# Patient Record
Sex: MALE | Race: WHITE | HISPANIC OR LATINO | Employment: FULL TIME | URBAN - METROPOLITAN AREA
[De-identification: names, ages, dates, MRNs, and addresses within clinical notes are randomized per-mention and may not be internally consistent; named-entity substitution may affect disease eponyms.]

---

## 2023-05-20 ENCOUNTER — HOSPITAL ENCOUNTER (EMERGENCY)
Facility: MEDICAL CENTER | Age: 58
End: 2023-05-20
Attending: EMERGENCY MEDICINE

## 2023-05-20 ENCOUNTER — APPOINTMENT (OUTPATIENT)
Dept: RADIOLOGY | Facility: MEDICAL CENTER | Age: 58
End: 2023-05-20
Attending: EMERGENCY MEDICINE

## 2023-05-20 VITALS
RESPIRATION RATE: 18 BRPM | BODY MASS INDEX: 24.76 KG/M2 | TEMPERATURE: 98.2 F | SYSTOLIC BLOOD PRESSURE: 121 MMHG | WEIGHT: 163.36 LBS | DIASTOLIC BLOOD PRESSURE: 82 MMHG | OXYGEN SATURATION: 97 % | HEART RATE: 72 BPM | HEIGHT: 68 IN

## 2023-05-20 DIAGNOSIS — M79.602 LEFT ARM PAIN: ICD-10-CM

## 2023-05-20 DIAGNOSIS — M54.13 RADICULOPATHY OF CERVICOTHORACIC REGION: ICD-10-CM

## 2023-05-20 LAB — EKG IMPRESSION: NORMAL

## 2023-05-20 PROCEDURE — A9270 NON-COVERED ITEM OR SERVICE: HCPCS | Performed by: EMERGENCY MEDICINE

## 2023-05-20 PROCEDURE — 700102 HCHG RX REV CODE 250 W/ 637 OVERRIDE(OP): Performed by: EMERGENCY MEDICINE

## 2023-05-20 PROCEDURE — 99284 EMERGENCY DEPT VISIT MOD MDM: CPT

## 2023-05-20 PROCEDURE — 96375 TX/PRO/DX INJ NEW DRUG ADDON: CPT

## 2023-05-20 PROCEDURE — 93005 ELECTROCARDIOGRAM TRACING: CPT

## 2023-05-20 PROCEDURE — 72125 CT NECK SPINE W/O DYE: CPT

## 2023-05-20 PROCEDURE — 96374 THER/PROPH/DIAG INJ IV PUSH: CPT

## 2023-05-20 PROCEDURE — 700111 HCHG RX REV CODE 636 W/ 250 OVERRIDE (IP): Performed by: EMERGENCY MEDICINE

## 2023-05-20 PROCEDURE — 93005 ELECTROCARDIOGRAM TRACING: CPT | Performed by: EMERGENCY MEDICINE

## 2023-05-20 RX ORDER — DEXAMETHASONE 4 MG/1
10 TABLET ORAL DAILY
Qty: 12 TABLET | Refills: 0 | Status: SHIPPED | OUTPATIENT
Start: 2023-05-20

## 2023-05-20 RX ORDER — DEXAMETHASONE SODIUM PHOSPHATE 10 MG/ML
10 INJECTION, SOLUTION INTRAMUSCULAR; INTRAVENOUS ONCE
Status: COMPLETED | OUTPATIENT
Start: 2023-05-20 | End: 2023-05-20

## 2023-05-20 RX ORDER — KETOROLAC TROMETHAMINE 10 MG/1
10 TABLET, FILM COATED ORAL 3 TIMES DAILY PRN
Qty: 15 TABLET | Refills: 0 | Status: SHIPPED | OUTPATIENT
Start: 2023-05-20

## 2023-05-20 RX ORDER — ONDANSETRON 2 MG/ML
4 INJECTION INTRAMUSCULAR; INTRAVENOUS ONCE
Status: COMPLETED | OUTPATIENT
Start: 2023-05-20 | End: 2023-05-20

## 2023-05-20 RX ORDER — TRAMADOL HYDROCHLORIDE 50 MG/1
50 TABLET ORAL EVERY 6 HOURS PRN
Qty: 28 TABLET | Refills: 0 | Status: SHIPPED | OUTPATIENT
Start: 2023-05-20 | End: 2023-05-27

## 2023-05-20 RX ORDER — METHOCARBAMOL 750 MG/1
750 TABLET, FILM COATED ORAL 4 TIMES DAILY
Qty: 120 TABLET | Refills: 0 | Status: SHIPPED | OUTPATIENT
Start: 2023-05-20 | End: 2023-07-05

## 2023-05-20 RX ORDER — METHOCARBAMOL 500 MG/1
1000 TABLET, FILM COATED ORAL ONCE
Status: COMPLETED | OUTPATIENT
Start: 2023-05-20 | End: 2023-05-20

## 2023-05-20 RX ORDER — MORPHINE SULFATE 4 MG/ML
4 INJECTION INTRAVENOUS ONCE
Status: COMPLETED | OUTPATIENT
Start: 2023-05-20 | End: 2023-05-20

## 2023-05-20 RX ORDER — KETOROLAC TROMETHAMINE 30 MG/ML
30 INJECTION, SOLUTION INTRAMUSCULAR; INTRAVENOUS ONCE
Status: COMPLETED | OUTPATIENT
Start: 2023-05-20 | End: 2023-05-20

## 2023-05-20 RX ADMIN — DEXAMETHASONE SODIUM PHOSPHATE 10 MG: 10 INJECTION INTRAMUSCULAR; INTRAVENOUS at 03:34

## 2023-05-20 RX ADMIN — KETOROLAC TROMETHAMINE 30 MG: 30 INJECTION, SOLUTION INTRAMUSCULAR; INTRAVENOUS at 03:32

## 2023-05-20 RX ADMIN — ONDANSETRON 4 MG: 2 INJECTION INTRAMUSCULAR; INTRAVENOUS at 03:34

## 2023-05-20 RX ADMIN — METHOCARBAMOL 1000 MG: 500 TABLET ORAL at 03:31

## 2023-05-20 RX ADMIN — MORPHINE SULFATE 4 MG: 4 INJECTION INTRAVENOUS at 03:36

## 2023-05-20 ASSESSMENT — PAIN DESCRIPTION - PAIN TYPE
TYPE: ACUTE PAIN

## 2023-05-20 NOTE — ED PROVIDER NOTES
ER Provider Note    Scribed for Dr. Audra Sher D.O. by Mikey Herzog. 5/20/2023  2:40 AM    Primary Care Provider: None noted    CHIEF COMPLAINT  Chief Complaint   Patient presents with    Pain     L side of body pain, pt describes it as nerve pain, constant, pain worsened tonight   Pt states he has had this pain since las year, saw neurologist which did not show any causes  Symptoms have not gone away  Has been taking amlodipine, ativan for BP and symptoms with little relief   Denies chest pain or SOB     EXTERNAL RECORDS REVIEWED  None available    HPI/ROS    LIMITATION TO HISTORY   Select: : None    Nikolas Mcarthur is a 57 y.o. male who presents to the ED for worsening left sided pain onset tonight. Months ago he was seen for hypertension by his cardiologist and had a catheterization which was negative. He was started on amlodipine at that time. At that time he was feeling back pain which he thought was a nerve, so he saw a neurologist. He had a MRI of his head which was negative but was unable to have one of his back as he quit his job. He then moved up here and got a new job, his insurance starts working in 10 days. He has been taking medications for his pain but tonight his pain is unbearable. His pain is describes as aching and radiates into his left arm. He denies any numbness or neck pain. He does not have a current PCP.    PAST MEDICAL HISTORY  Past Medical History:   Diagnosis Date    Hypertension      SURGICAL HISTORY  History reviewed. No pertinent surgical history.    FAMILY HISTORY  History reviewed. No pertinent family history.    SOCIAL HISTORY   reports that he has never smoked. He has never used smokeless tobacco. He reports that he does not currently use alcohol. He reports that he does not use drugs.    CURRENT MEDICATIONS  Discharge Medication List as of 5/20/2023  5:20 AM        ALLERGIES  Patient has no known allergies.    PHYSICAL EXAM  BP (!) 159/108   Pulse 94   Temp 36.8 °C (98.2 °F)  "(Temporal)   Resp 12   Ht 1.727 m (5' 8\")   Wt 74.1 kg (163 lb 5.8 oz)   SpO2 97%   BMI 24.84 kg/m²   Constitutional: Patient is well developed, well nourished.  Moderate distress from his left arm pain  HENT: Normocephalic, atraumatic.  External mucosa.  Cardiovascular: Normal heart rate and Regular rhythm. No murmur,   Thorax & Lungs: Clear and equal breath sounds with good excursion. No respiratory distress, no rhonchi, wheezing or rales.   Abdomen: Bowel sounds normal in all four quadrants. Soft,nontender, no rebound , guarding, palpable masses.   Skin: Warm, Dry, No erythema, No rashes.   Back: No cervical, or lumbosacral tenderness. Tenderness at the left thoracic spine at T2-T3 level extending in suprascapular region.  Extremities: Peripheral pulses 4/4     Neurologic: Alert & oriented x 3, Normal motor function, Normal sensory function, equal  strength bilaterally, subjective pain in left arm  Psychiatric: Affect normal, Judgment normal, Mood normal.     DIAGNOSTIC STUDIES & PROCEDURES    Radiology:   The attending Emergency Physician has independently interpreted the diagnostic imaging associated with this visit and is awaiting the final reading from the radiologist, which will be displayed below.  Preliminary interpretation is a follows: No herniated disks or spinal cord compression  Radiologist interpretation:    CT-CSPINE WITHOUT PLUS RECONS   Final Result         1. No acute fracture from C1 through T3 is visualized.             COURSE & MEDICAL DECISION MAKING    ED Observation Status? Yes; I am placing the patient in to an observation status due to a diagnostic uncertainty as well as therapeutic intensity. Patient placed in observation status at 2:43 AM, 5/20/2023.     Observation plan is as follows: Monitor for symptom management and diagnostic results     Upon Reevaluation, the patient's condition has: Improved; and will be discharged.    Patient discharged from ED Observation status at " 0509 (Time) 5/20/23 (Date).     INITIAL ASSESSMENT AND PLAN  Care Narrative:       2:40 AM - Patient seen and evaluated at bedside. Discussed plan of care, including medications, EKG and imaging. Patient agrees to plan of care. Patient will be treated with Decadron 10 mg, Toradol 30 mg. Robaxin 1000 mg, morphine 4 mg and Zofran 4 mg for his symptoms. Ordered CT-C-spine w/o to evaluate. Differential diagnoses include but are not limited to: cervical radiculopathy vs CAD vs musculoskeletal arm pain  Patient was notified that his CT was unremarkable.  He is completely pain-free upon recheck and thankful for the medications that he got.  I will send in prescriptions for Toradol, Robaxin and tramadol along with Decadron for home.  He sees a primary care doctor for further evaluation as he may need to have an MRI of his neck.  He is stable upon discharge.               DISPOSITION AND DISCUSSIONS  I have discussed management of the patient with the following physicians and LES's:     Discussion of management with other Q or appropriate source(s): None     Barriers to care at this time, including but not limited to: Patient does not have established PCP.     Decision tools and prescription drugs considered including, but not limited to: Pain Medications Robaxin, Toradol, tramadol, Decadron .        FINAL IMPRESSION   1. Radiculopathy of cervicothoracic region    2. Left arm pain         Mikey MORAN (Scribe), am scribing for, and in the presence of, Audra Sher D.O..    Electronically signed by: Mikey Herzog (Victorinoibe), 5/20/2023    Audra MORAN D.O. personally performed the services described in this documentation, as scribed by Mikey Herzog in my presence, and it is both accurate and complete.    The note accurately reflects work and decisions made by me.  Audra Sher D.O.  5/20/2023  7:17 AM

## 2023-05-20 NOTE — ED NOTES
Pt ambulated to red 6 from lobby with steady gait.  On monitor, call light in reach. Chart up for ERP.

## 2023-05-20 NOTE — DISCHARGE INSTRUCTIONS
Call first thing Monday morning to schedule an appointment with a primary care provider so that you can establish a doctor-patient relationship for further care and referrals.  Apply moist heat or warm moist compresses to the affected area of your neck and shoulder region.  Try using over-the-counter topical Biofreeze and then apply moist heat.  Take the medications I am prescribing you as directed with food  Return if worsening

## 2023-05-20 NOTE — ED TRIAGE NOTES
"Chief Complaint   Patient presents with    Pain     L side of body pain, pt describes it as nerve pain, constant, pain worsened tonight   Pt states he has had this pain since las year, saw neurologist which did not show any causes  Symptoms have not gone away  Has been taking amlodipine, ativan for BP and symptoms with little relief      BP (!) 159/108   Pulse 94   Temp 36.8 °C (98.2 °F) (Temporal)   Resp 12   Ht 1.727 m (5' 8\")   Wt 74.1 kg (163 lb 5.8 oz)   SpO2 97%   BMI 24.84 kg/m²     Pt made aware of triage process, placed back into lobby, educated pt to tell staff of any worsening of symptoms     "

## 2023-05-20 NOTE — ED NOTES
Checked on bed, connected to monitor,  with unlabored respirations. Vital signs is stable. Denied any new complaints. Gurney in low position, side rail up for pt safety. Call light within reach. Will continue to monitor. Chart up for ERP re-evaluation

## 2023-05-20 NOTE — ED NOTES
Pt discharged to ED lobby. GCS 15. IV discontinued and gauze placed, pt in possession of belongings. Pt provided discharge education and information pertaining to medications and follow up appointments. Pt received copy of discharge instructions and verbalized understanding.     Vitals:    05/20/23 0502   BP: 121/82   Pulse: 72   Resp: 18   Temp: 36.8 °C (98.2 °F)   SpO2: 97%

## 2023-05-24 ENCOUNTER — HOSPITAL ENCOUNTER (EMERGENCY)
Facility: MEDICAL CENTER | Age: 58
End: 2023-05-24
Attending: EMERGENCY MEDICINE

## 2023-05-24 VITALS
BODY MASS INDEX: 25.31 KG/M2 | SYSTOLIC BLOOD PRESSURE: 163 MMHG | RESPIRATION RATE: 18 BRPM | OXYGEN SATURATION: 95 % | WEIGHT: 166.98 LBS | HEIGHT: 68 IN | DIASTOLIC BLOOD PRESSURE: 103 MMHG | HEART RATE: 71 BPM | TEMPERATURE: 98 F

## 2023-05-24 DIAGNOSIS — Z76.0 MEDICATION REFILL: ICD-10-CM

## 2023-05-24 PROCEDURE — A9270 NON-COVERED ITEM OR SERVICE: HCPCS | Performed by: EMERGENCY MEDICINE

## 2023-05-24 PROCEDURE — 99283 EMERGENCY DEPT VISIT LOW MDM: CPT

## 2023-05-24 PROCEDURE — 700102 HCHG RX REV CODE 250 W/ 637 OVERRIDE(OP): Performed by: EMERGENCY MEDICINE

## 2023-05-24 RX ORDER — LORAZEPAM 1 MG/1
1 TABLET ORAL ONCE
Status: COMPLETED | OUTPATIENT
Start: 2023-05-24 | End: 2023-05-24

## 2023-05-24 RX ORDER — LORAZEPAM 1 MG/1
1 TABLET ORAL 2 TIMES DAILY
Qty: 6 TABLET | Refills: 0 | Status: SHIPPED | OUTPATIENT
Start: 2023-05-24 | End: 2023-05-27

## 2023-05-24 RX ORDER — LORAZEPAM 1 MG/1
1 TABLET ORAL 2 TIMES DAILY
Qty: 6 TABLET | Refills: 0 | Status: SHIPPED | OUTPATIENT
Start: 2023-05-24 | End: 2023-05-24 | Stop reason: SDUPTHER

## 2023-05-24 RX ADMIN — LORAZEPAM 1 MG: 1 TABLET ORAL at 03:47

## 2023-05-24 NOTE — ED TRIAGE NOTES
Chief Complaint   Patient presents with    Pain     Pt reports pain to L side of body starting today keeping him from sleeping (he has had an MRI and is followed by neurology and was prescribed ativan which he has been taking for 3 months)    Medication Refill     Ran out of lorazepam yesterday and can't get a refill because he is between insurances. Sx started today. Reports anxiety and tearfulness     Pt ambulatory to triage for above complaint. VSS, anxious and emotional but in NAD

## 2023-05-24 NOTE — ED PROVIDER NOTES
ED Provider Note    CHIEF COMPLAINT  Chief Complaint   Patient presents with    Pain     Pt reports pain to L side of body starting today keeping him from sleeping (he has had an MRI and is followed by neurology and was prescribed ativan which he has been taking for 3 months)    Medication Refill     Ran out of lorazepam yesterday and can't get a refill because he is between insurances. Sx started today. Reports anxiety and tearfulness       EXTERNAL RECORDS REVIEWED  Inpatient Notes ER note from 520 reviewed    HPI/ROS  LIMITATION TO HISTORY   Select: : None  OUTSIDE HISTORIAN(S):  None    Nikolas Mcarthur is a 57 y.o. male who [facility] the [facility] with [facility] refill.  Patient states that he was previously on Ativan but this was not refilled a few days ago during his ER visit.  He has been picked up the medications as provided the other day and he states that this is helped with the majority of his symptoms but is hopeful in getting a few days of his benzodiazepine until he can see his PCP for outpatient refill this coming Friday in 3 days time.  Otherwise denies any other new features or concerns.  He just continues to be anxious and hopeful for medication refill stated above.    PAST MEDICAL HISTORY   has a past medical history of Hypertension.    SURGICAL HISTORY  patient denies any surgical history    FAMILY HISTORY  History reviewed. No pertinent family history.    SOCIAL HISTORY  Social History     Tobacco Use    Smoking status: Never    Smokeless tobacco: Never   Substance and Sexual Activity    Alcohol use: Not Currently    Drug use: Never    Sexual activity: Not on file       CURRENT MEDICATIONS  Home Medications       Reviewed by Radha Razo R.N. (Registered Nurse) on 05/24/23 at 0152  Med List Status: Not Addressed     Medication Last Dose Status   dexamethasone (DECADRON) 4 MG Tab  Active   ketorolac (TORADOL) 10 MG Tab  Active   methocarbamol (ROBAXIN) 750 MG Tab  Active  "  traMADol (ULTRAM) 50 MG Tab  Active                    ALLERGIES  No Known Allergies    PHYSICAL EXAM  VITAL SIGNS: BP (!) 163/103   Pulse 71   Temp 36.7 °C (98 °F) (Temporal)   Resp 18   Ht 1.727 m (5' 8\")   Wt 75.7 kg (166 lb 15.6 oz)   SpO2 95%   BMI 25.39 kg/m²      Pulse ox interpretation: I interpret this pulse ox as normal.  Constitutional: Alert in no apparent distress.  HENT: No signs of trauma, Bilateral external ears normal, Nose normal.   Eyes: Pupils are equal and reactive  Neck: Normal range of motion, No tenderness, Supple  Cardiovascular: Regular rate and rhythm, no murmurs.   Thorax & Lungs: Normal breath sounds, No respiratory distress, No wheezing, No chest tenderness.   Abdomen: Bowel sounds normal, Soft, No tenderness, No masses, No pulsatile masses. No peritoneal signs.  Skin: Warm, Dry, No erythema, No rash.   Extremities: Intact distal pulses  Musculoskeletal: Good range of motion in all major joints. No tenderness to palpation or major deformities noted.   Neurologic: Alert , Normal motor function, Normal sensory function, No focal deficits noted.   Psychiatric: Affect is anxious, Judgment normal, Mood normal.     In prescribing controlled substances to this patient, I certify that I have obtained and reviewed the medical history of Nikolas Mcarthur. I have also made a good dianna effort to obtain applicable records from other providers who have treated the patient and records did not demonstrate any increased risk of substance abuse that would prevent me from prescribing controlled substances.     I have conducted a physical exam and documented it. I have reviewed Mr. Mcarthur’s prescription history as maintained by the [place] Prescription Monitoring Program.     I have assessed the patient’s risk for abuse, dependency, and addiction using the validated Opioid Risk Tool available at https://www.mdcalc.com/ibhzlj-syil-uncd-ort-narcotic-abuse.     Given the above, I believe the benefits of " controlled substance therapy outweigh the risks. The reasons for prescribing controlled substances include in my professional opinion, controlled substances are the only reasonable choice for this patient because for anxiety control . Accordingly, I have discussed the risk and benefits, treatment plan, and alternative therapies with the patient.       COURSE & MEDICAL DECISION MAKING    ED Observation Status? No; Patient does not meet criteria for ED Observation.     INITIAL ASSESSMENT, COURSE AND PLAN  Care Narrative: Patient presented emergency department for medication refill.  I will refill this with strict instructions the patient will need ongoing outpatient controlled substance refills to be completed by PCP.    DISPOSITION AND DISCUSSIONS  I have discussed management of the patient with the following physicians and LES's: None    Discussion of management with other QHP or appropriate source(s): Pharmacy for medication verification      Escalation of care considered, and ultimately not performed:blood analysis, diagnostic imaging, and acute inpatient care management, however at this time, the patient is most appropriate for outpatient management    Barriers to care at this time, including but not limited to:  Recently relocated from [place] and attempting to establish PCP here in [place] .     Decision tools and prescription drugs considered including, but not limited to: Medication modification medication refill will be completed .  57-year-old male presented to the [facility] with above presentation.  Ativan refilled for 3 days.  Patient understanding of strict use of this and that only 3-day supply will be completed and will not likely be recompleted if representation to the ER occurs.  He is understanding such controlled substances should be provided on an ongoing basis by PCP.  He will continue all other medications as previously prescribed.      FINAL DIAGNOSIS  1. Medication refill            Electronically signed by: Preston Kauffman M.D., 5/24/2023 3:42 AM

## 2023-05-24 NOTE — ED NOTES
Patient discharged home per ERP.  Discharge teaching and education discussed with patient. POC discussed.   Patient verbalized understanding of discharge teaching and education. No other questions at this time.     RX x 1 given to patient.     VSS. Patient alert and oriented. Patient arranged ride for self. Able to ambulate off unit safely with steady gait.

## 2023-06-09 ENCOUNTER — HOSPITAL ENCOUNTER (EMERGENCY)
Facility: MEDICAL CENTER | Age: 58
End: 2023-06-09
Attending: EMERGENCY MEDICINE
Payer: COMMERCIAL

## 2023-06-09 VITALS
HEART RATE: 82 BPM | WEIGHT: 166 LBS | OXYGEN SATURATION: 98 % | RESPIRATION RATE: 18 BRPM | BODY MASS INDEX: 25.24 KG/M2 | TEMPERATURE: 98.9 F | DIASTOLIC BLOOD PRESSURE: 98 MMHG | SYSTOLIC BLOOD PRESSURE: 140 MMHG

## 2023-06-09 DIAGNOSIS — S39.012A STRAIN OF LUMBAR REGION, INITIAL ENCOUNTER: ICD-10-CM

## 2023-06-09 PROCEDURE — 99284 EMERGENCY DEPT VISIT MOD MDM: CPT

## 2023-06-09 PROCEDURE — 700102 HCHG RX REV CODE 250 W/ 637 OVERRIDE(OP): Performed by: EMERGENCY MEDICINE

## 2023-06-09 PROCEDURE — A9270 NON-COVERED ITEM OR SERVICE: HCPCS | Performed by: EMERGENCY MEDICINE

## 2023-06-09 RX ORDER — METHYLPREDNISOLONE 4 MG/1
TABLET ORAL
Qty: 1 EACH | Refills: 0 | Status: SHIPPED | OUTPATIENT
Start: 2023-06-09

## 2023-06-09 RX ORDER — DEXAMETHASONE 4 MG/1
8 TABLET ORAL ONCE
Status: COMPLETED | OUTPATIENT
Start: 2023-06-09 | End: 2023-06-09

## 2023-06-09 RX ORDER — CYCLOBENZAPRINE HCL 10 MG
10 TABLET ORAL 3 TIMES DAILY PRN
Qty: 30 TABLET | Refills: 0 | Status: SHIPPED | OUTPATIENT
Start: 2023-06-09 | End: 2023-07-05

## 2023-06-09 RX ORDER — OXYCODONE HYDROCHLORIDE AND ACETAMINOPHEN 5; 325 MG/1; MG/1
2 TABLET ORAL ONCE
Status: COMPLETED | OUTPATIENT
Start: 2023-06-09 | End: 2023-06-09

## 2023-06-09 RX ADMIN — OXYCODONE AND ACETAMINOPHEN 2 TABLET: 325; 5 TABLET ORAL at 11:28

## 2023-06-09 RX ADMIN — DEXAMETHASONE 8 MG: 4 TABLET ORAL at 11:04

## 2023-06-09 ASSESSMENT — PAIN DESCRIPTION - PAIN TYPE: TYPE: ACUTE PAIN;NEUROPATHIC PAIN

## 2023-06-09 NOTE — ED PROVIDER NOTES
ED Provider Note    CHIEF COMPLAINT  Chief Complaint   Patient presents with    Flank Pain     BIB EMS, bilateral flank pain after standing from desk    Low Back Pain       EXTERNAL RECORDS REVIEWED  None available    HPI/ROS  LIMITATION TO HISTORY   None  OUTSIDE HISTORIAN(S):  None    Nikolas Mcarthur is a 58 y.o. male who presents evaluation of an episode of acute low back pain.  The patient was at work, was hunching over a desk and felt an immediate sense of pain in his low back.  He denies any direct blunt or penetrating trauma.  The pain was so bad it brought him to his knees and 911 was called.  He specifically denies any bowel or bladder incontinence numbness weakness or tingling to the arms legs or face.  He is not on any anticoagulants or blood thinners.  He only takes amlodipine for hypertension.  No history of neck or back surgery.  No flank pain or urinary symptoms such as dysuria or hematuria.  No high fevers or chills.  Patient has never experienced pain like this before does appear to be improving when he lies flat    PAST MEDICAL HISTORY   has a past medical history of Hypertension.    SURGICAL HISTORY  patient denies any surgical history  No major surgeries  FAMILY HISTORY  No family history on file.  Noncontributory  SOCIAL HISTORY  Social History     Tobacco Use    Smoking status: Never    Smokeless tobacco: Never   Substance and Sexual Activity    Alcohol use: Not Currently    Drug use: Never    Sexual activity: Not on file       CURRENT MEDICATIONS  Home Medications       Reviewed by Jodie Louis R.N. (Registered Nurse) on 06/09/23 at 1023  Med List Status: Partial     Medication Last Dose Status   dexamethasone (DECADRON) 4 MG Tab  Active   ketorolac (TORADOL) 10 MG Tab  Active   methocarbamol (ROBAXIN) 750 MG Tab  Active                    ALLERGIES  No Known Allergies    PHYSICAL EXAM  VITAL SIGNS: BP (!) 137/99   Pulse (!) 102   Temp 37.2 °C (98.9 °F) (Temporal)   Resp 16   Wt 75.3  kg (166 lb)   SpO2 97%   BMI 25.24 kg/m²    Pulse ox interpretation: I interpret this pulse ox as normal.  Constitutional: Alert and oriented x 3, no acute distress  HEENT: Atraumatic normocephalic, pupils are equal round reactive to light extraocular movements are intact. The nares is clear, external ears are normal, mouth shows moist mucous membranes normal dentition for age  Neck: Supple, no JVD no tracheal deviation  Cardiovascular: Mild tachycardia no murmur rub or gallop 2+ pulses peripherally x4  Thorax & Lungs: No respiratory distress, no wheezes rales or rhonchi, No chest tenderness.   GI: Soft nontender nondistended positive bowel sounds, no peritoneal signs no flank pain no rebound or guarding  Back: No midline thoracolumbar bony tenderness there is significant paraspinal spasm noted at L3-L4-L5 without step-off no overlying skin changes.  Skin: Warm dry no acute rash or lesion  Musculoskeletal: Moving all extremities with full range and 5 of 5 strength no acute  deformity  Neurologic: Cranial nerves III through XII are grossly intact no sensory deficit no cerebellar dysfunction normal motor and sensory exam in upper and lower extremities bilateral patellar DTRs intact  Psychiatric: Anxious        DIAGNOSTIC STUDIES / PROCEDURES  Considered imaging but not clinically indicated  LABS  Considered but not clinically indicated consider considered imaging but not clinically indicated        COURSE & MEDICAL DECISION MAKING    ED Observation Status? No; Patient does not meet criteria for ED Observation.     INITIAL ASSESSMENT, COURSE AND PLAN  Care Narrative:    This is a very pleasant 58-year-old gentleman presents here with nontraumatic acute low back pain.  His vital signs are reassuring and normal.  Specifically no hypotension high fever tachycardia or hypoxia.  On exam he has some paraspinal spasm and pain but no midline bony tenderness.  There is no direct blunt or penetrating trauma to suggest high  likelihood of fracture.  He had no neurological symptoms such as bowel or bladder incontinence motor or sensory loss to suggest cauda equina or spinal cord compression or injury.  I counseled the patient that routine imaging for this type of symptomatology is not routinely performed in the emergency department.  He was given a dose of Decadron and Percocet here.  I will continue him on a course of a Medrol Dosepak and Flexeril and recommend he establish with a PCP.  I will provide referral for outpatient PCP      ADDITIONAL PROBLEM LIST    DISPOSITION AND DISCUSSIONS  I have discussed management of the patient with the following physicians and LES's: None    Discussion of management with other QHP or appropriate source(s): None    Escalation of care considered, and ultimately not performed:IV fluids, blood analysis, and diagnostic imaging    Barriers to care at this time, including but not limited to: Patient does not have established PCP.     Decision tools and prescription drugs considered including, but not limited to: Patient will be prescribed steroids as well as muscle relaxants    FINAL DIAGNOSIS  1. Strain of lumbar region, initial encounter  methylPREDNISolone (MEDROL DOSEPAK) 4 MG Tablet Therapy Pack    cyclobenzaprine (FLEXERIL) 10 mg Tab    Referral to establish with [facility] PCP               Electronically signed by: Preston Villatoro M.D., 6/9/2023 10:46 AM

## 2023-06-09 NOTE — ED TRIAGE NOTES
Chief Complaint   Patient presents with    Flank Pain     BIB EMS, bilateral flank pain after standing from desk    Low Back Pain     Pt reports that he did not stretch this morning or warm up prior to going to work.  Reports that he was sitting at his desk when stood up and had severe pain that dropped him to the floor.   BP (!) 137/99   Pulse (!) 102   Temp 37.2 °C (98.9 °F) (Temporal)   Resp 16   Wt 75.3 kg (166 lb)   SpO2 97%   Pt informed of wait times. Educated on triage process.  Asked to return to triage RN for any new or worsening of symptoms. Thanked for patience.

## 2023-06-13 ENCOUNTER — TELEPHONE (OUTPATIENT)
Dept: HEALTH INFORMATION MANAGEMENT | Facility: OTHER | Age: 58
End: 2023-06-13
Payer: COMMERCIAL

## 2023-07-05 ENCOUNTER — HOSPITAL ENCOUNTER (EMERGENCY)
Facility: MEDICAL CENTER | Age: 58
End: 2023-07-05
Attending: EMERGENCY MEDICINE
Payer: COMMERCIAL

## 2023-07-05 VITALS
HEART RATE: 63 BPM | WEIGHT: 160 LBS | HEIGHT: 68 IN | DIASTOLIC BLOOD PRESSURE: 78 MMHG | BODY MASS INDEX: 24.25 KG/M2 | SYSTOLIC BLOOD PRESSURE: 127 MMHG | TEMPERATURE: 97.5 F | RESPIRATION RATE: 16 BRPM | OXYGEN SATURATION: 94 %

## 2023-07-05 DIAGNOSIS — R51.9 FACIAL PAIN: ICD-10-CM

## 2023-07-05 DIAGNOSIS — G89.29 CHRONIC BILATERAL LOW BACK PAIN, UNSPECIFIED WHETHER SCIATICA PRESENT: ICD-10-CM

## 2023-07-05 DIAGNOSIS — R25.1 TREMOR: ICD-10-CM

## 2023-07-05 DIAGNOSIS — M54.50 CHRONIC BILATERAL LOW BACK PAIN, UNSPECIFIED WHETHER SCIATICA PRESENT: ICD-10-CM

## 2023-07-05 LAB — EKG IMPRESSION: NORMAL

## 2023-07-05 PROCEDURE — A9270 NON-COVERED ITEM OR SERVICE: HCPCS | Performed by: EMERGENCY MEDICINE

## 2023-07-05 PROCEDURE — 700102 HCHG RX REV CODE 250 W/ 637 OVERRIDE(OP): Performed by: EMERGENCY MEDICINE

## 2023-07-05 PROCEDURE — 99283 EMERGENCY DEPT VISIT LOW MDM: CPT

## 2023-07-05 PROCEDURE — 93005 ELECTROCARDIOGRAM TRACING: CPT | Performed by: EMERGENCY MEDICINE

## 2023-07-05 RX ORDER — GABAPENTIN 300 MG/1
300 CAPSULE ORAL 3 TIMES DAILY PRN
Qty: 30 CAPSULE | Refills: 0 | Status: SHIPPED | OUTPATIENT
Start: 2023-07-05 | End: 2023-07-20

## 2023-07-05 RX ORDER — GABAPENTIN 300 MG/1
300 CAPSULE ORAL ONCE
Status: COMPLETED | OUTPATIENT
Start: 2023-07-05 | End: 2023-07-05

## 2023-07-05 RX ADMIN — GABAPENTIN 300 MG: 300 CAPSULE ORAL at 02:04

## 2023-07-05 NOTE — ED PROVIDER NOTES
ER Provider Note    Scribed for Fox Redmond Ii, M.d. by Rogelio Negrete. 7/5/2023  1:34 AM    Primary Care Provider: Pcp Pt States None    CHIEF COMPLAINT  Chief Complaint   Patient presents with    Tremors     Arms, legs and arms     EXTERNAL RECORDS REVIEWED  PDMP Patient gets #60 Ativan 1 mg tablet prescriptions every 2 months. Last prescription was filled 5/26/23.    HPI/ROS  LIMITATION TO HISTORY   Select: : None  OUTSIDE HISTORIAN(S):  None    Nikolas Mcarthur is a 58 y.o. male who presents to the ED complaining of having tremors, pain in his head around his left ear, and burning in his lower left back which onset 6 months ago. He states he was followed by a physician in [place] where an MRI was taken, which was inconclusive. He states he changed insurers due to a change in employment, and no longer has coverage for additional MRI's. He states he was shaking prior to arrival, but this has resolved. He initially states he does not use marijuana, but admits to using two marijuana edible products to help sleep tonight. He states he ran out of his lorazepam 3 days ago, and adds that he doesn't take the medication every day. He states he takes baclofen, amlodipine, lorazepam, and escitalopram. Ran out of lorazepam 3 days ago.  Was not taking daily. He states he is not an anxious person, and instead is feeling pain and was prescribed medications to treat mood disorders, which he states is very frustrating. He states he sees a chiropractor with no alleviation of his symptoms.    Pain at face is near left ear and radiates to left face. No weakness. No visual problems. No fever.     PAST MEDICAL HISTORY  Past Medical History:   Diagnosis Date    Hypertension      SURGICAL HISTORY  No past surgical history noted as pertinent.    FAMILY HISTORY  No family history noted as pertinent.    SOCIAL HISTORY   reports that he has never smoked. He has never used smokeless tobacco. He reports that he does not currently use  "alcohol. He reports that he does not use drugs.    CURRENT MEDICATIONS  Discharge Medication List as of 7/5/2023  4:36 AM        CONTINUE these medications which have NOT CHANGED    Details   methylPREDNISolone (MEDROL DOSEPAK) 4 MG Tablet Therapy Pack Use as directed, Disp-1 Each, R-0, Normal      dexamethasone (DECADRON) 4 MG Tab Take 2.5 Tablets by mouth every day. Take with food, Disp-12 Tablet, R-0, Normal      ketorolac (TORADOL) 10 MG Tab Take 1 Tablet by mouth 3 times a day as needed for Moderate Pain. With food, Disp-15 Tablet, R-0, Normal           ALLERGIES  Patient has no known allergies.    PHYSICAL EXAM  BP (!) 137/98   Pulse 81   Temp 36.9 °C (98.4 °F) (Temporal)   Resp 15   Ht 1.727 m (5' 8\")   Wt 72.6 kg (160 lb)   SpO2 98%   BMI 24.33 kg/m²   Physical Exam  Vitals and nursing note reviewed.   Constitutional:       Appearance: Normal appearance.   HENT:      Head: Normocephalic and atraumatic.      Mouth/Throat:      Mouth: Mucous membranes are moist.   Eyes:      Extraocular Movements: Extraocular movements intact.      Conjunctiva/sclera: Conjunctivae normal.      Pupils: Pupils are equal, round, and reactive to light.   Cardiovascular:      Rate and Rhythm: Normal rate and regular rhythm.   Pulmonary:      Effort: Pulmonary effort is normal.      Breath sounds: Normal breath sounds.   Neurological:      General: No focal deficit present.      Mental Status: He is alert and oriented to person, place, and time.      Comments: No facial droop. Clear speech. Good strength in all extremities. Minimal extremity tremor.    Psychiatric:      Comments: Anxious affect     DIAGNOSTIC STUDIES    EKG:   I have independently interpreted this EKG  Results for orders placed or performed during the hospital encounter of 07/05/23   EKG   Result Value Ref Range    Report       [facility]    Test Date:  2023-07-05  Pt Name:    MARY KATE FENTON                 Department: " ER  MRN:        [id number]                      Room:  Gender:     Male                         Technician: [id number]  :        1965                   Requested By:ER TRIAGE PROTOCOL  Order #:    [id number]                    Reading MD: Fox Redmond II, MD    Measurements  Intervals                                Axis  Rate:       85                           P:          40  VT:         163                          QRS:        41  QRSD:       84                           T:          36  QT:         396  QTc:        471    Interpretive Statements  Sinus rhythm  Rate 85  Normal intervals  No ST elevation or depression. Normal twaves.  Impression: Normal sinus rhythm EKG  Compared to ECG 2023 00:07:28    Electronically Signed On 2023 01:49:10 PDT by Fox Redmond II, MD       COURSE & MEDICAL DECISION MAKING     ED Observation Status? Yes; I am placing the patient in to an observation status due to a diagnostic uncertainty as well as therapeutic intensity. Patient placed in observation status at 1:55 AM, 2023.     Observation plan is as follows: serial exams, response to treatment    Upon Reevaluation, the patient's condition has: Improved; and will be discharged.    Patient discharged from ED Observation status at 4:16 AM 23.    INITIAL ASSESSMENT, COURSE AND PLAN  Care Narrative:     1:48 AM - Patient seen and examined at bedside. This is an emergent evaluation of a 58 year old male with chronic back pain, who presents with complaints of tremors, left sided head pain surrounding his left ear, and lower left back burning pain. Pain complaints have been occurring for months.  He is seeing providers in [place] for these problems.  Tonight he came to ER because he was feeling anxious, having tremors, and a very dry mouth.  He took 2 cannabis edibles today to help with sleep but he is unable to sleep. He has had prescriptions for lorazepam very 2 months and says he does not take daily.  Ran out 3 days ago. Prescriber is in [place]. His symptoms tonight could be due to cannabis ingestion or benzo withdrawal. Difficult to tell. He has no tachycardia. Tremor is minimal. Blood pressure borderline elevated. Left sided facial pain could be trigeminal neuralgia. No neurologic deficits. Plan to give gabapentin 300mg and continue to monitor, if worsening may need to more aggressively treat benzo withdrawal. Patient agrees to the plan of care.    4:14 AM - Patient was reevaluated at bedside. He states he feels better now. Tremor improved. Facial pain resolved as well.       PROBLEM LIST  #Tremors   -possibly due to cannabis vs lorazepam withdrawal. No tachycardia or hypertension to suggest serious benzo withdrawal   -resolved in ER    #Facial Pain   -Gabapentin for trigeminal neuralgia like symptoms.     #Chronic back pain   -recommended follow up with local spine clinic. Possible radiculopathy. No neurologic deficits.     DISPOSITION AND DISCUSSIONS  I have discussed management of the patient with the following physicians and LES's:  None    Discussion of management with other [facility] or appropriate source(s): None     Escalation of care considered, and ultimately not performed: acute inpatient care management, however at this time, the patient is most appropriate for outpatient management.    Barriers to care at this time, including but not limited to: No barriers to care noted during this encounter.     Decision tools and prescription drugs considered including, but not limited to:  Gabapentin 300 mg capsules .    Patient will be discharged home.    FOLLOW UP:  Follow up with your regular providers.          Robbie Betancur M.D.  [address] [place] [address]  [phone]      For chronic back pain    OUTPATIENT MEDICATIONS:  Discharge Medication List as of 7/5/2023  4:36 AM        START taking these medications    Details   gabapentin (NEURONTIN) 300 MG Cap Take 1 Capsule by mouth 3 times a day as  needed (facial pain) for up to 15 days. Do not take while driving or operating potentially dangerous equipment., Disp-30 Capsule, R-0, Normal           FINAL DIAGNOSIS  1. Tremor    2. Facial pain    3. Chronic bilateral low back pain, unspecified whether sciatica present      Rogelio MORAN (Scribe), am scribing for, and in the presence of, FEDE Moore II.    Electronically signed by: Rogelio Negrete (Scribe), 7/5/2023    IFox II, M* personally performed the services described in this documentation, as scribed by Rogelio Negrete in my presence, and it is both accurate and complete.    The note accurately reflects work and decisions made by me.  Fox Redmond II, M.D.  7/5/2023  9:21 AM

## 2023-07-05 NOTE — ED TRIAGE NOTES
"Chief Complaint   Patient presents with    Tremors     Arms, legs and arms       58 yr patient walked in to triage for above complaint. Per patient this all started yesterday.     Pt placed in lobby. Pt educated on triage process. Pt encouraged to alert staff for any changes.     Patient and staff wearing appropriate PPE    BP (!) 137/98   Pulse (!) 115   Temp 36.9 °C (98.4 °F) (Temporal)   Resp (!) 40   Ht 1.727 m (5' 8\")   Wt 72.6 kg (160 lb)   SpO2 99%   BMI 24.33 kg/m²     "

## 2023-07-05 NOTE — ED NOTES
Pt given discharge instructions regarding follow up, prescriptions, and reasons to return to the ER. Pt verbalized understanding and signed AVS

## 2023-07-12 ENCOUNTER — HOSPITAL ENCOUNTER (EMERGENCY)
Facility: MEDICAL CENTER | Age: 58
End: 2023-07-13
Attending: EMERGENCY MEDICINE
Payer: COMMERCIAL

## 2023-07-12 ENCOUNTER — APPOINTMENT (OUTPATIENT)
Dept: RADIOLOGY | Facility: MEDICAL CENTER | Age: 58
End: 2023-07-12
Attending: EMERGENCY MEDICINE

## 2023-07-12 DIAGNOSIS — E87.6 HYPOKALEMIA: ICD-10-CM

## 2023-07-12 DIAGNOSIS — F41.9 ANXIETY: ICD-10-CM

## 2023-07-12 LAB
BASOPHILS # BLD AUTO: 1 % (ref 0–1.8)
BASOPHILS # BLD: 0.09 K/UL (ref 0–0.12)
EOSINOPHIL # BLD AUTO: 0.16 K/UL (ref 0–0.51)
EOSINOPHIL NFR BLD: 1.7 % (ref 0–6.9)
ERYTHROCYTE [DISTWIDTH] IN BLOOD BY AUTOMATED COUNT: 41.1 FL (ref 35.9–50)
HCT VFR BLD AUTO: 45.3 % (ref 42–52)
HGB BLD-MCNC: 15.9 G/DL (ref 14–18)
IMM GRANULOCYTES # BLD AUTO: 0.02 K/UL (ref 0–0.11)
IMM GRANULOCYTES NFR BLD AUTO: 0.2 % (ref 0–0.9)
LYMPHOCYTES # BLD AUTO: 4.91 K/UL (ref 1–4.8)
LYMPHOCYTES NFR BLD: 52.9 % (ref 22–41)
MCH RBC QN AUTO: 29.9 PG (ref 27–33)
MCHC RBC AUTO-ENTMCNC: 35.1 G/DL (ref 32.3–36.5)
MCV RBC AUTO: 85.2 FL (ref 81.4–97.8)
MONOCYTES # BLD AUTO: 0.7 K/UL (ref 0–0.85)
MONOCYTES NFR BLD AUTO: 7.5 % (ref 0–13.4)
NEUTROPHILS # BLD AUTO: 3.4 K/UL (ref 1.82–7.42)
NEUTROPHILS NFR BLD: 36.7 % (ref 44–72)
NRBC # BLD AUTO: 0 K/UL
NRBC BLD-RTO: 0 /100 WBC (ref 0–0.2)
PLATELET # BLD AUTO: 285 K/UL (ref 164–446)
PMV BLD AUTO: 11.2 FL (ref 9–12.9)
RBC # BLD AUTO: 5.32 M/UL (ref 4.7–6.1)
WBC # BLD AUTO: 9.3 K/UL (ref 4.8–10.8)

## 2023-07-12 PROCEDURE — 93005 ELECTROCARDIOGRAM TRACING: CPT

## 2023-07-12 PROCEDURE — 71045 X-RAY EXAM CHEST 1 VIEW: CPT

## 2023-07-12 PROCEDURE — 99285 EMERGENCY DEPT VISIT HI MDM: CPT

## 2023-07-12 PROCEDURE — 96375 TX/PRO/DX INJ NEW DRUG ADDON: CPT

## 2023-07-12 PROCEDURE — 700111 HCHG RX REV CODE 636 W/ 250 OVERRIDE (IP): Performed by: EMERGENCY MEDICINE

## 2023-07-12 PROCEDURE — 36415 COLL VENOUS BLD VENIPUNCTURE: CPT

## 2023-07-12 PROCEDURE — 93005 ELECTROCARDIOGRAM TRACING: CPT | Performed by: EMERGENCY MEDICINE

## 2023-07-12 PROCEDURE — 80053 COMPREHEN METABOLIC PANEL: CPT

## 2023-07-12 PROCEDURE — 84484 ASSAY OF TROPONIN QUANT: CPT

## 2023-07-12 PROCEDURE — 85025 COMPLETE CBC W/AUTO DIFF WBC: CPT

## 2023-07-12 RX ORDER — LORAZEPAM 2 MG/ML
1 INJECTION INTRAMUSCULAR ONCE
Status: COMPLETED | OUTPATIENT
Start: 2023-07-12 | End: 2023-07-12

## 2023-07-12 RX ADMIN — LORAZEPAM 1 MG: 2 INJECTION INTRAMUSCULAR; INTRAVENOUS at 23:33

## 2023-07-13 VITALS
OXYGEN SATURATION: 96 % | DIASTOLIC BLOOD PRESSURE: 80 MMHG | RESPIRATION RATE: 16 BRPM | HEART RATE: 65 BPM | WEIGHT: 165.57 LBS | HEIGHT: 68 IN | BODY MASS INDEX: 25.09 KG/M2 | TEMPERATURE: 97.4 F | SYSTOLIC BLOOD PRESSURE: 118 MMHG

## 2023-07-13 LAB
ALBUMIN SERPL BCP-MCNC: 4.6 G/DL (ref 3.2–4.9)
ALBUMIN/GLOB SERPL: 1.8 G/DL
ALP SERPL-CCNC: 89 U/L (ref 30–99)
ALT SERPL-CCNC: 15 U/L (ref 2–50)
ANION GAP SERPL CALC-SCNC: 20 MMOL/L (ref 7–16)
AST SERPL-CCNC: 16 U/L (ref 12–45)
BILIRUB SERPL-MCNC: 0.4 MG/DL (ref 0.1–1.5)
BUN SERPL-MCNC: 12 MG/DL (ref 8–22)
CALCIUM ALBUM COR SERPL-MCNC: 8.9 MG/DL (ref 8.5–10.5)
CALCIUM SERPL-MCNC: 9.4 MG/DL (ref 8.5–10.5)
CHLORIDE SERPL-SCNC: 103 MMOL/L (ref 96–112)
CO2 SERPL-SCNC: 17 MMOL/L (ref 20–33)
CREAT SERPL-MCNC: 1.07 MG/DL (ref 0.5–1.4)
EKG IMPRESSION: NORMAL
EKG IMPRESSION: NORMAL
GFR SERPLBLD CREATININE-BSD FMLA CKD-EPI: 81 ML/MIN/1.73 M 2
GLOBULIN SER CALC-MCNC: 2.6 G/DL (ref 1.9–3.5)
GLUCOSE SERPL-MCNC: 162 MG/DL (ref 65–99)
POTASSIUM SERPL-SCNC: 2.8 MMOL/L (ref 3.6–5.5)
PROT SERPL-MCNC: 7.2 G/DL (ref 6–8.2)
SODIUM SERPL-SCNC: 140 MMOL/L (ref 135–145)
TROPONIN T SERPL-MCNC: <6 NG/L (ref 6–19)

## 2023-07-13 PROCEDURE — 96365 THER/PROPH/DIAG IV INF INIT: CPT

## 2023-07-13 PROCEDURE — 93005 ELECTROCARDIOGRAM TRACING: CPT | Performed by: EMERGENCY MEDICINE

## 2023-07-13 PROCEDURE — 700105 HCHG RX REV CODE 258: Performed by: EMERGENCY MEDICINE

## 2023-07-13 PROCEDURE — 700111 HCHG RX REV CODE 636 W/ 250 OVERRIDE (IP): Performed by: EMERGENCY MEDICINE

## 2023-07-13 PROCEDURE — 96366 THER/PROPH/DIAG IV INF ADDON: CPT

## 2023-07-13 RX ORDER — POTASSIUM CHLORIDE 7.45 MG/ML
10 INJECTION INTRAVENOUS ONCE
Status: COMPLETED | OUTPATIENT
Start: 2023-07-13 | End: 2023-07-13

## 2023-07-13 RX ORDER — SODIUM CHLORIDE 9 MG/ML
1000 INJECTION, SOLUTION INTRAVENOUS ONCE
Status: COMPLETED | OUTPATIENT
Start: 2023-07-13 | End: 2023-07-13

## 2023-07-13 RX ORDER — POTASSIUM CHLORIDE 20 MEQ/1
40 TABLET, EXTENDED RELEASE ORAL DAILY
Status: DISCONTINUED | OUTPATIENT
Start: 2023-07-13 | End: 2023-07-13 | Stop reason: HOSPADM

## 2023-07-13 RX ADMIN — SODIUM CHLORIDE 1000 ML: 9 INJECTION, SOLUTION INTRAVENOUS at 01:26

## 2023-07-13 RX ADMIN — POTASSIUM CHLORIDE 10 MEQ: 7.46 INJECTION, SOLUTION INTRAVENOUS at 01:31

## 2023-07-13 ASSESSMENT — PAIN SCALES - WONG BAKER: WONGBAKER_NUMERICALRESPONSE: DOESN'T HURT AT ALL

## 2023-07-13 NOTE — ED PROVIDER NOTES
"ED Provider Note    CHIEF COMPLAINT  Chief Complaint   Patient presents with    Tremors     Tremors x1 hour . Pt was seen on 7/5 for the same cc and was discharged with Gabapentin. Pt states he has been compliant but is still having full body tremors.        EXTERNAL RECORDS REVIEWED  Inpatient Notes ER notes from 5/24/2023 when I saw the patient last reviewed.  Additional 6/9/2023 and 7/5/2023 notes reviewed    HPI/ROS  LIMITATION TO HISTORY   Select: Behavior anxiety  OUTSIDE HISTORIAN(S):  None    Nikolas Mcarthur is a 58 y.o. male who presents to the emergency department with feeling of anxiety, tremors and chest pain.  States no change from prior evaluation by myself.  Admits to hypertension.  Denies any specific event that may have triggered his discomfort this evening.  Denies drugs or alcohol.    PAST MEDICAL HISTORY   has a past medical history of Hypertension.    SURGICAL HISTORY  patient denies any surgical history    FAMILY HISTORY  History reviewed. No pertinent family history.    SOCIAL HISTORY  Social History     Tobacco Use    Smoking status: Never    Smokeless tobacco: Never   Substance and Sexual Activity    Alcohol use: Not Currently     Comment: socially    Drug use: Yes     Types: Oral     Comment: edibles    Sexual activity: Not on file       CURRENT MEDICATIONS  Home Medications       Reviewed by Nicole Figueroa R.N. (Registered Nurse) on 07/12/23 at 2254  Med List Status: Partial     Medication Last Dose Status   dexamethasone (DECADRON) 4 MG Tab  Active   gabapentin (NEURONTIN) 300 MG Cap  Active   ketorolac (TORADOL) 10 MG Tab  Active   methylPREDNISolone (MEDROL DOSEPAK) 4 MG Tablet Therapy Pack  Active                    ALLERGIES  No Known Allergies    PHYSICAL EXAM  VITAL SIGNS: /80   Pulse 65   Temp 36.3 °C (97.4 °F) (Temporal)   Resp 16   Ht 1.727 m (5' 8\")   Wt 75.1 kg (165 lb 9.1 oz)   SpO2 96%   BMI 25.17 kg/m²      Pulse ox interpretation: I interpret this pulse " ox as normal.  Constitutional: Alert in no apparent distress.  HENT: No signs of trauma, Bilateral external ears normal, Nose normal.   Eyes: Pupils are equal and reactive  Neck: Normal range of motion, No tenderness, Supple  Cardiovascular: Regular rate and rhythm, no murmurs.   Thorax & Lungs: Normal breath sounds, No respiratory distress, No wheezing, No chest tenderness.   Abdomen: Bowel sounds normal, Soft, No tenderness  Skin: Warm, Dry, No erythema, No rash.   Extremities: Intact distal pulses  Musculoskeletal: Good range of motion in all major joints. No tenderness to palpation or major deformities noted.   Neurologic: Alert , Normal motor function, Normal sensory function, No focal deficits noted.   Psychiatric: Affect is anxious, Judgment normal, Mood normal.         DIAGNOSTIC STUDIES / PROCEDURES    LABS  Results for orders placed or performed during the hospital encounter of 07/12/23   CBC with Differential   Result Value Ref Range    WBC 9.3 4.8 - 10.8 K/uL    RBC 5.32 4.70 - 6.10 M/uL    Hemoglobin 15.9 14.0 - 18.0 g/dL    Hematocrit 45.3 42.0 - 52.0 %    MCV 85.2 81.4 - 97.8 fL    MCH 29.9 27.0 - 33.0 pg    MCHC 35.1 32.3 - 36.5 g/dL    RDW 41.1 35.9 - 50.0 fL    Platelet Count 285 164 - 446 K/uL    MPV 11.2 9.0 - 12.9 fL    Neutrophils-Polys 36.70 (L) 44.00 - 72.00 %    Lymphocytes 52.90 (H) 22.00 - 41.00 %    Monocytes 7.50 0.00 - 13.40 %    Eosinophils 1.70 0.00 - 6.90 %    Basophils 1.00 0.00 - 1.80 %    Immature Granulocytes 0.20 0.00 - 0.90 %    Nucleated RBC 0.00 0.00 - 0.20 /100 WBC    Neutrophils (Absolute) 3.40 1.82 - 7.42 K/uL    Lymphs (Absolute) 4.91 (H) 1.00 - 4.80 K/uL    Monos (Absolute) 0.70 0.00 - 0.85 K/uL    Eos (Absolute) 0.16 0.00 - 0.51 K/uL    Baso (Absolute) 0.09 0.00 - 0.12 K/uL    Immature Granulocytes (abs) 0.02 0.00 - 0.11 K/uL    NRBC (Absolute) 0.00 K/uL   Complete Metabolic Panel (CMP)   Result Value Ref Range    Sodium 140 135 - 145 mmol/L    Potassium 2.8 (L) 3.6 -  5.5 mmol/L    Chloride 103 96 - 112 mmol/L    Co2 17 (L) 20 - 33 mmol/L    Anion Gap 20.0 (H) 7.0 - 16.0    Glucose 162 (H) 65 - 99 mg/dL    Bun 12 8 - 22 mg/dL    Creatinine 1.07 0.50 - 1.40 mg/dL    Calcium 9.4 8.5 - 10.5 mg/dL    AST(SGOT) 16 12 - 45 U/L    ALT(SGPT) 15 2 - 50 U/L    Alkaline Phosphatase 89 30 - 99 U/L    Total Bilirubin 0.4 0.1 - 1.5 mg/dL    Albumin 4.6 3.2 - 4.9 g/dL    Total Protein 7.2 6.0 - 8.2 g/dL    Globulin 2.6 1.9 - 3.5 g/dL    A-G Ratio 1.8 g/dL   Troponins NOW   Result Value Ref Range    Troponin T <6 6 - 19 ng/L   CORRECTED CALCIUM   Result Value Ref Range    Correct Calcium 8.9 8.5 - 10.5 mg/dL   ESTIMATED GFR   Result Value Ref Range    GFR (CKD-EPI) 81 >60 mL/min/1.73 m 2   EKG   Result Value Ref Range    Report       [facility] Emergency Dept.    Test Date:  2023  Pt Name:    MARY KATE FENTON                 Department: ER  MRN:        [id number]                      Room:       RD 02  Gender:     Male                         Technician: [id number]  :        1965                   Requested By:ER TRIAGE PROTOCOL  Order #:    [id number]                    Reading MD: Preston Kauffman    Measurements  Intervals                                Axis  Rate:       110                          P:          71  UT:         156                          QRS:        71  QRSD:       81                           T:          0  QT:         378  QTc:        512    Interpretive Statements  Sinus tachycardia  Probable left atrial enlargement  Repol abnrm, severe global ischemia (LM/MVD)  Prolonged QT interval  Compared to ECG 2023 00:57:06  Early repolarization now present  Possible ischemia now present  Prolonged QT interval now present  Sinus rhythm no longer present  ST (T wave) deviation n o longer present  Electronically Signed On 2023 03:04:12 PDT by Preston Kauffman     EKG   Result Value Ref Range    Report       [facility] Emergency  Dept.    Test Date:  2023  Pt Name:    MARY KATE FENTON                 Department: ER  MRN:        [id number]                      Room:       RD 02  Gender:     Male                         Technician: [id number]  :        1965                   Requested By:SHAYY MONAHAN  Order #:    [id number]                    Reading MD:    Measurements  Intervals                                Axis  Rate:       67                           P:          47  TX:         169                          QRS:        54  QRSD:       91                           T:          39  QT:         446  QTc:        471    Interpretive Statements  Sinus rhythm  Compared to ECG 2023 23:15:57  Sinus tachycardia no longer present  Early repolarization no longer present  Possible ischemia no longer present  Prolonged QT interval no longer present           RADIOLOGY  I have independently interpreted the diagnostic imaging associated with this visit and am waiting the final reading from the radiologist.   My preliminary interpretation is as follows: No pneumothorax or consolidative process  Radiologist interpretation:   DX-CHEST-PORTABLE (1 VIEW)   Final Result      No acute cardiopulmonary abnormality.               COURSE & MEDICAL DECISION MAKING    ED Observation Status? Yes; I am placing the patient in to an observation status due to a diagnostic uncertainty as well as therapeutic intensity. Patient placed in observation status at 2300, 2023.     Observation plan is as follows: Patient presenting emerged department with what I believe is likely recurrent anxiety attack.  He is however complaining of chest pain and tremors.  Have a very low suspicion for ACS nor seizure-like activity.  Will initiate treatment with Ativan and monitoring    Patient with significant improvement after medications as provided.  Reassurance also seemingly helping his overall presentation and symptomatology.    Upon Reevaluation, the patient's condition  has: Improved; and will be discharged.    Patient discharged from ED Observation status at 0 300 (Time) 7/13/2023 (Date).     INITIAL ASSESSMENT, COURSE AND PLAN  Care Narrative: Patient presented emerged part with above presentation.  Please see observation plan  DISPOSITION AND DISCUSSIONS  I have discussed management of the patient with the following physicians and LES's: None    Discussion of management with other [facility] or appropriate source(s): Pharmacy for medication verification      Escalation of care considered, and ultimately not performed:acute inpatient care management, however at this time, the patient is most appropriate for outpatient management    Barriers to care at this time, including but not limited to: Patient does not have established PCP.     Decision tools and prescription drugs considered including, but not limited to: HEART Score low .  58-year-old male presenting to the emerged part with the above presentation.  Again I do believe he is having recurrent anxiety attack.  Additionally today he was slightly hypokalemic and this has been repleted.  He is feeling significant better.  He did have an initial EKG that was somewhat abnormal due to I believe that some of this may be secondary to plate sticker placement and agitation.  Repeat EKG is more appropriate and reassuring.  Troponin is negative.  Again heart score is low.  Will discharge home with outpatient follow-up by PCP as referred.    FINAL DIAGNOSIS  1. Anxiety    2. Hypokalemia           Electronically signed by: Preston Kauffman M.D., 7/13/2023 12:00 AM

## 2023-07-13 NOTE — ED NOTES
Bedside report received from GEORGIANA Shah. Assumed patient care. Verified patient identification. Pt connected to monitor. ERP @ bedside. [place] in low position, side rail up for pt safety. Call light within reach. Will continue to monitor for any complications.

## 2023-07-13 NOTE — ED NOTES
"Pt discharged home by self. . IV discontinued and gauze placed, pt in possession of belongings. Pt provided discharge education and information pertaining to medications and follow up appointments. Pt received copy of discharge instructions and verbalized understanding. /80   Pulse 65   Temp 36.3 °C (97.4 °F) (Temporal)   Resp 16   Ht 1.727 m (5' 8\")   Wt 75.1 kg (165 lb 9.1 oz)   SpO2 96%   BMI 25.17 kg/m²     "

## 2023-07-13 NOTE — ED NOTES
Pt room air saturation went down to 88-89%.    Pt rendered oxygen @ 2 lpm via nasal cannula saturating 96-97%.

## 2023-07-13 NOTE — ED TRIAGE NOTES
Chief Complaint   Patient presents with    Tremors     Tremors x1 hour . Pt was seen on 7/5 for the same cc and was discharged with Gabapentin. Pt states he has been compliant but is still having full body tremors.       Patient ambulatory to triage for above complaint. Pt educated on triage process, placed back in lobby, and instructed to inform staff of any changes.